# Patient Record
Sex: FEMALE | Race: WHITE | Employment: UNEMPLOYED | URBAN - METROPOLITAN AREA
[De-identification: names, ages, dates, MRNs, and addresses within clinical notes are randomized per-mention and may not be internally consistent; named-entity substitution may affect disease eponyms.]

---

## 2017-12-15 ENCOUNTER — TELEPHONE (OUTPATIENT)
Dept: BARIATRICS/WEIGHT MGMT | Age: 22
End: 2017-12-15

## 2019-07-18 ENCOUNTER — HOSPITAL ENCOUNTER (EMERGENCY)
Age: 24
Discharge: HOME | End: 2019-07-18
Payer: COMMERCIAL

## 2019-07-18 VITALS
TEMPERATURE: 99.1 F | HEIGHT: 68 IN | HEART RATE: 104 BPM | OXYGEN SATURATION: 97 % | BODY MASS INDEX: 40.92 KG/M2 | WEIGHT: 270 LBS | RESPIRATION RATE: 18 BRPM | SYSTOLIC BLOOD PRESSURE: 130 MMHG | DIASTOLIC BLOOD PRESSURE: 83 MMHG

## 2019-07-18 ASSESSMENT — PAIN SCALES - GENERAL: PAINLEVEL_OUTOF10: 9

## 2019-07-19 ENCOUNTER — APPOINTMENT (OUTPATIENT)
Dept: GENERAL RADIOLOGY | Age: 24
End: 2019-07-19
Payer: COMMERCIAL

## 2019-07-19 ENCOUNTER — APPOINTMENT (OUTPATIENT)
Dept: CT IMAGING | Age: 24
End: 2019-07-19
Payer: COMMERCIAL

## 2019-07-19 ENCOUNTER — HOSPITAL ENCOUNTER (EMERGENCY)
Age: 24
Discharge: HOME | End: 2019-07-20
Attending: EMERGENCY MEDICINE
Payer: COMMERCIAL

## 2019-07-19 ENCOUNTER — APPOINTMENT (OUTPATIENT)
Dept: ULTRASOUND IMAGING | Age: 24
End: 2019-07-19
Payer: COMMERCIAL

## 2019-07-19 DIAGNOSIS — R10.84 GENERALIZED ABDOMINAL PAIN: Primary | ICD-10-CM

## 2019-07-19 DIAGNOSIS — N12 PYELONEPHRITIS: ICD-10-CM

## 2019-07-19 DIAGNOSIS — R51.9 NONINTRACTABLE HEADACHE, UNSPECIFIED CHRONICITY PATTERN, UNSPECIFIED HEADACHE TYPE: ICD-10-CM

## 2019-07-19 DIAGNOSIS — R93.429 ABNORMAL CT SCAN, KIDNEY: ICD-10-CM

## 2019-07-19 LAB
ALBUMIN SERPL-MCNC: 3.8 GM/DL (ref 3.4–5)
ALP BLD-CCNC: 61 IU/L (ref 40–129)
ALT SERPL-CCNC: 23 U/L (ref 10–40)
ANION GAP SERPL CALCULATED.3IONS-SCNC: 11 MMOL/L (ref 4–16)
AST SERPL-CCNC: 19 IU/L (ref 15–37)
BASOPHILS ABSOLUTE: 0 K/CU MM
BASOPHILS RELATIVE PERCENT: 0.2 % (ref 0–1)
BILIRUB SERPL-MCNC: 0.9 MG/DL (ref 0–1)
BUN BLDV-MCNC: 6 MG/DL (ref 6–23)
CALCIUM SERPL-MCNC: 9.1 MG/DL (ref 8.3–10.6)
CHLORIDE BLD-SCNC: 101 MMOL/L (ref 99–110)
CO2: 24 MMOL/L (ref 21–32)
CREAT SERPL-MCNC: 0.9 MG/DL (ref 0.6–1.1)
DIFFERENTIAL TYPE: ABNORMAL
EOSINOPHILS ABSOLUTE: 0.1 K/CU MM
EOSINOPHILS RELATIVE PERCENT: 1.2 % (ref 0–3)
GFR AFRICAN AMERICAN: >60 ML/MIN/1.73M2
GFR NON-AFRICAN AMERICAN: >60 ML/MIN/1.73M2
GLUCOSE BLD-MCNC: 68 MG/DL (ref 70–99)
HCG QUALITATIVE: NEGATIVE
HCT VFR BLD CALC: 42.8 % (ref 37–47)
HEMOGLOBIN: 13.1 GM/DL (ref 12.5–16)
IMMATURE NEUTROPHIL %: 0.3 % (ref 0–0.43)
LACTATE: 0.7 MMOL/L (ref 0.4–2)
LIPASE: 30 IU/L (ref 13–60)
LYMPHOCYTES ABSOLUTE: 1.2 K/CU MM
LYMPHOCYTES RELATIVE PERCENT: 12.1 % (ref 24–44)
MCH RBC QN AUTO: 28.4 PG (ref 27–31)
MCHC RBC AUTO-ENTMCNC: 30.6 % (ref 32–36)
MCV RBC AUTO: 92.8 FL (ref 78–100)
MONOCYTES ABSOLUTE: 1 K/CU MM
MONOCYTES RELATIVE PERCENT: 10.1 % (ref 0–4)
NUCLEATED RBC %: 0 %
PDW BLD-RTO: 13.4 % (ref 11.7–14.9)
PLATELET # BLD: 228 K/CU MM (ref 140–440)
PMV BLD AUTO: 10.5 FL (ref 7.5–11.1)
POTASSIUM SERPL-SCNC: 3.5 MMOL/L (ref 3.5–5.1)
RBC # BLD: 4.61 M/CU MM (ref 4.2–5.4)
SEGMENTED NEUTROPHILS ABSOLUTE COUNT: 7.7 K/CU MM
SEGMENTED NEUTROPHILS RELATIVE PERCENT: 76.1 % (ref 36–66)
SODIUM BLD-SCNC: 136 MMOL/L (ref 135–145)
TOTAL IMMATURE NEUTOROPHIL: 0.03 K/CU MM
TOTAL NUCLEATED RBC: 0 K/CU MM
TOTAL PROTEIN: 6.8 GM/DL (ref 6.4–8.2)
TROPONIN T: <0.01 NG/ML
WBC # BLD: 10.2 K/CU MM (ref 4–10.5)

## 2019-07-19 PROCEDURE — 93005 ELECTROCARDIOGRAM TRACING: CPT | Performed by: EMERGENCY MEDICINE

## 2019-07-19 PROCEDURE — 96374 THER/PROPH/DIAG INJ IV PUSH: CPT

## 2019-07-19 PROCEDURE — 6360000002 HC RX W HCPCS: Performed by: PHYSICIAN ASSISTANT

## 2019-07-19 PROCEDURE — 87086 URINE CULTURE/COLONY COUNT: CPT

## 2019-07-19 PROCEDURE — 83605 ASSAY OF LACTIC ACID: CPT

## 2019-07-19 PROCEDURE — 85025 COMPLETE CBC W/AUTO DIFF WBC: CPT

## 2019-07-19 PROCEDURE — 84484 ASSAY OF TROPONIN QUANT: CPT

## 2019-07-19 PROCEDURE — 6370000000 HC RX 637 (ALT 250 FOR IP): Performed by: PHYSICIAN ASSISTANT

## 2019-07-19 PROCEDURE — 84703 CHORIONIC GONADOTROPIN ASSAY: CPT

## 2019-07-19 PROCEDURE — 2580000003 HC RX 258: Performed by: PHYSICIAN ASSISTANT

## 2019-07-19 PROCEDURE — 71275 CT ANGIOGRAPHY CHEST: CPT

## 2019-07-19 PROCEDURE — 87186 SC STD MICRODIL/AGAR DIL: CPT

## 2019-07-19 PROCEDURE — 83690 ASSAY OF LIPASE: CPT

## 2019-07-19 PROCEDURE — 80053 COMPREHEN METABOLIC PANEL: CPT

## 2019-07-19 PROCEDURE — 99284 EMERGENCY DEPT VISIT MOD MDM: CPT

## 2019-07-19 PROCEDURE — 71045 X-RAY EXAM CHEST 1 VIEW: CPT

## 2019-07-19 PROCEDURE — 81001 URINALYSIS AUTO W/SCOPE: CPT

## 2019-07-19 PROCEDURE — 87077 CULTURE AEROBIC IDENTIFY: CPT

## 2019-07-19 PROCEDURE — 93971 EXTREMITY STUDY: CPT

## 2019-07-19 PROCEDURE — 6360000004 HC RX CONTRAST MEDICATION: Performed by: PHYSICIAN ASSISTANT

## 2019-07-19 PROCEDURE — 74177 CT ABD & PELVIS W/CONTRAST: CPT

## 2019-07-19 RX ORDER — LORAZEPAM 2 MG/ML
1 INJECTION INTRAMUSCULAR ONCE
Status: DISCONTINUED | OUTPATIENT
Start: 2019-07-19 | End: 2019-07-20 | Stop reason: HOSPADM

## 2019-07-19 RX ORDER — DIPHENHYDRAMINE HCL 25 MG
25 TABLET ORAL ONCE
Status: COMPLETED | OUTPATIENT
Start: 2019-07-19 | End: 2019-07-19

## 2019-07-19 RX ORDER — SODIUM CHLORIDE 0.9 % (FLUSH) 0.9 %
10 SYRINGE (ML) INJECTION 2 TIMES DAILY
Status: DISCONTINUED | OUTPATIENT
Start: 2019-07-19 | End: 2019-07-20 | Stop reason: HOSPADM

## 2019-07-19 RX ORDER — 0.9 % SODIUM CHLORIDE 0.9 %
1000 INTRAVENOUS SOLUTION INTRAVENOUS ONCE
Status: COMPLETED | OUTPATIENT
Start: 2019-07-19 | End: 2019-07-19

## 2019-07-19 RX ORDER — METOCLOPRAMIDE HYDROCHLORIDE 5 MG/ML
10 INJECTION INTRAMUSCULAR; INTRAVENOUS ONCE
Status: COMPLETED | OUTPATIENT
Start: 2019-07-19 | End: 2019-07-19

## 2019-07-19 RX ORDER — ACETAMINOPHEN 500 MG
1000 TABLET ORAL ONCE
Status: COMPLETED | OUTPATIENT
Start: 2019-07-19 | End: 2019-07-19

## 2019-07-19 RX ADMIN — ACETAMINOPHEN 1000 MG: 500 TABLET ORAL at 22:01

## 2019-07-19 RX ADMIN — SODIUM CHLORIDE 1000 ML: 9 INJECTION, SOLUTION INTRAVENOUS at 22:00

## 2019-07-19 RX ADMIN — METOCLOPRAMIDE 10 MG: 5 INJECTION, SOLUTION INTRAMUSCULAR; INTRAVENOUS at 22:02

## 2019-07-19 RX ADMIN — IOPAMIDOL 100 ML: 755 INJECTION, SOLUTION INTRAVENOUS at 22:47

## 2019-07-19 RX ADMIN — DIPHENHYDRAMINE HCL 25 MG: 25 TABLET ORAL at 22:01

## 2019-07-19 RX ADMIN — Medication 10 ML: at 22:48

## 2019-07-19 ASSESSMENT — PAIN DESCRIPTION - PAIN TYPE: TYPE: ACUTE PAIN

## 2019-07-19 ASSESSMENT — PAIN SCALES - GENERAL
PAINLEVEL_OUTOF10: 9
PAINLEVEL_OUTOF10: 6

## 2019-07-19 ASSESSMENT — PAIN DESCRIPTION - LOCATION: LOCATION: ABDOMEN;HEAD

## 2019-07-19 NOTE — ED TRIAGE NOTES
Pt presents to ED for c/o abdominal cramping and n/v since last night. Pt states she had gastric bypass a month ago.

## 2019-07-19 NOTE — ED NOTES
Merline Jurist PA out to see pt in the waiting room but no answer when called.      Miri Saunders RN  07/18/19 5798

## 2019-07-20 VITALS
RESPIRATION RATE: 24 BRPM | HEART RATE: 86 BPM | WEIGHT: 293 LBS | SYSTOLIC BLOOD PRESSURE: 111 MMHG | OXYGEN SATURATION: 97 % | TEMPERATURE: 101.4 F | DIASTOLIC BLOOD PRESSURE: 73 MMHG | HEIGHT: 68 IN | BODY MASS INDEX: 44.41 KG/M2

## 2019-07-20 LAB
BACTERIA: NEGATIVE /HPF
BILIRUBIN URINE: NEGATIVE MG/DL
BLOOD, URINE: ABNORMAL
CLARITY: CLEAR
COLOR: YELLOW
GLUCOSE, URINE: NEGATIVE MG/DL
KETONES, URINE: ABNORMAL MG/DL
LEUKOCYTE ESTERASE, URINE: ABNORMAL
MUCUS: ABNORMAL HPF
NITRITE URINE, QUANTITATIVE: POSITIVE
PH, URINE: 6 (ref 5–8)
PROTEIN UA: 30 MG/DL
RBC URINE: 7 /HPF (ref 0–6)
SPECIFIC GRAVITY UA: >1.06 (ref 1–1.03)
SPECIFIC GRAVITY UA: ABNORMAL (ref 1–1.03)
SQUAMOUS EPITHELIAL: 9 /HPF
TRICHOMONAS: ABNORMAL /HPF
UROBILINOGEN, URINE: 2 MG/DL (ref 0.2–1)
WBC UA: 70 /HPF (ref 0–5)

## 2019-07-20 PROCEDURE — 93010 ELECTROCARDIOGRAM REPORT: CPT | Performed by: INTERNAL MEDICINE

## 2019-07-20 PROCEDURE — 6370000000 HC RX 637 (ALT 250 FOR IP): Performed by: PHYSICIAN ASSISTANT

## 2019-07-20 RX ORDER — ONDANSETRON 4 MG/1
4 TABLET, ORALLY DISINTEGRATING ORAL EVERY 6 HOURS
Qty: 10 TABLET | Refills: 0 | Status: SHIPPED | OUTPATIENT
Start: 2019-07-20 | End: 2020-02-17

## 2019-07-20 RX ORDER — CEPHALEXIN 250 MG/1
500 CAPSULE ORAL ONCE
Status: COMPLETED | OUTPATIENT
Start: 2019-07-20 | End: 2019-07-20

## 2019-07-20 RX ORDER — CEPHALEXIN 500 MG/1
500 CAPSULE ORAL 2 TIMES DAILY
Qty: 28 CAPSULE | Refills: 0 | Status: SHIPPED | OUTPATIENT
Start: 2019-07-20 | End: 2019-08-03

## 2019-07-20 RX ADMIN — CEPHALEXIN 500 MG: 250 CAPSULE ORAL at 01:08

## 2019-07-20 NOTE — ED PROVIDER NOTES
eMERGENCY dEPARTMENT eNCOUnter      PCP: No primary care provider on file. CHIEF COMPLAINT    Chief Complaint   Patient presents with    Abdominal Pain    Emesis    Dizziness     Pt was seen by physician    JHONNY    Jose Alejandro Gonzalez is a 30 y.o. female who presents with multiple complaints. She reports that she has been having some abdominal pain nausea vomiting for the last couple days. Presented to the ED yesterday but left without being seen. She was vomiting yesterday but states that she has been told to keep a lot of food down and has been having some issues with eating since her gastric bypass surgery in June of this year. She does report that the vomiting has resolved and she did have a small formed bowel movement today. She reports that the abdominal complaints are somewhat improving but still present. She presents today with chief complaint of headache. Reports that she has headache for the last several days. She does report history of headaches many years ago. She denies any head injury or trauma. She had some vague chest pain yesterday and had nothing since. She has had some intermittent lightheadedness over the last couple days. She denies current chest pain shortness of breath. REVIEW OF SYSTEMS    Constitutional:  Denies fever, chills, weight loss or weakness   HENT:  Denies sore throat or ear pain   Cardiovascular:  Denies current chest pain, palpitations   Respiratory:  Denies cough or shortness of breath    GI:  See hpi  :  Dark urine. Denies other urinary or vaginal complaints  Musculoskeletal:  Denies back pain,   Skin:  Denies rash  Neurologic:  Denies focal weakness or sensory changes.  Headache   Endocrine:  Denies polyuria or polydypsia   Lymphatic:  Denies swollen glands     All other review of systems are negative  See HPI and nursing notes for additional information     PAST MEDICAL AND SURGICAL HISTORY    Past Medical History:   Diagnosis Date    MRSA (methicillin Clinical  IMPRESSION    1. Generalized abdominal pain    2. Nonintractable headache, unspecified chronicity pattern, unspecified headache type    3. Abnormal CT scan, kidney    4. Pyelonephritis      Patient presents as above. She is mildly tachycardic and febrile on arrival.  Work-up was initiated as above. No complications related to surgery however she does have some abnormal findings on CT and bilateral kidneys. UA obtained and does reveal evidence of infection. We will treat abnormal findings on kidneys as likely pyelonephritis. On recheck patient feels much better. Her headache is gone. Her belly pain is gone. She is tolerating p.o. and is in no pain. She is agreeable to outpatient management at this time and understands if she develops any new or worsening symptoms that she will need to return to the ED for further evaluation. Discussed follow-up with PCP for recheck on abnormal findings on CT. Patient advised to return to the ED at anytime for new or worsening symptoms. Patient verbalized understanding and agreement with the POC. Comment: Please note this report has been produced using speech recognition software and may contain errors related to that system including errors in grammar, punctuation, and spelling, as well as words and phrases that may be inappropriate. If there are any questions or concerns please feel free to contact the dictating provider for clarification.         Kvng Christensen PA-C  07/23/19 2794

## 2019-07-20 NOTE — ED NOTES
Report headache pain 2 days. Reports emesis yesterday. C/O right leg pain for 2 days also. US at bedside.      Alex Sepulveda RN  07/19/19 1353

## 2019-07-22 LAB
CULTURE: ABNORMAL
Lab: ABNORMAL
SPECIMEN: ABNORMAL
TOTAL COLONY COUNT: ABNORMAL

## 2019-07-24 LAB
EKG ATRIAL RATE: 100 BPM
EKG DIAGNOSIS: NORMAL
EKG P AXIS: 33 DEGREES
EKG P-R INTERVAL: 124 MS
EKG Q-T INTERVAL: 330 MS
EKG QRS DURATION: 78 MS
EKG QTC CALCULATION (BAZETT): 425 MS
EKG R AXIS: 16 DEGREES
EKG T AXIS: 5 DEGREES
EKG VENTRICULAR RATE: 100 BPM

## 2020-02-17 ENCOUNTER — HOSPITAL ENCOUNTER (EMERGENCY)
Age: 25
Discharge: HOME | End: 2020-02-17
Payer: COMMERCIAL

## 2020-02-17 VITALS
SYSTOLIC BLOOD PRESSURE: 117 MMHG | OXYGEN SATURATION: 98 % | HEIGHT: 68 IN | RESPIRATION RATE: 18 BRPM | DIASTOLIC BLOOD PRESSURE: 69 MMHG | BODY MASS INDEX: 44.85 KG/M2 | TEMPERATURE: 98.4 F | HEART RATE: 90 BPM

## 2020-02-17 PROCEDURE — 99283 EMERGENCY DEPT VISIT LOW MDM: CPT

## 2020-02-17 RX ORDER — DEXTROMETHORPHAN HYDROBROMIDE AND PROMETHAZINE HYDROCHLORIDE 15; 6.25 MG/5ML; MG/5ML
5 SYRUP ORAL 4 TIMES DAILY PRN
Qty: 120 ML | Refills: 0 | Status: SHIPPED | OUTPATIENT
Start: 2020-02-17 | End: 2020-02-24

## 2020-02-17 ASSESSMENT — PAIN DESCRIPTION - PAIN TYPE: TYPE: ACUTE PAIN

## 2020-02-17 ASSESSMENT — PAIN SCALES - GENERAL: PAINLEVEL_OUTOF10: 7

## 2020-02-17 NOTE — ED PROVIDER NOTES
file   [facility] needs:     Medical: Not on file     Non-medical: Not on file   Tobacco Use    Smoking status: Never Smoker    Smokeless tobacco: Never Used   Substance and Sexual Activity    Alcohol use: No    Drug use: No    Sexual activity: Yes     Partners: Male   Lifestyle    Physical activity:     Days per week: Not on file     Minutes per session: Not on file    Stress: Not on file   Relationships    Social connections:     Talks on phone: Not on file     Gets together: Not on file     Attends Zoroastrianism service: Not on file     Active member of club or organization: Not on file     Attends meetings of clubs or organizations: Not on file     Relationship status: Not on file    Intimate partner violence:     Fear of current or ex partner: Not on file     Emotionally abused: Not on file     Physically abused: Not on file     Forced sexual activity: Not on file   Other Topics Concern    Not on file   Social History Narrative    Not on file       PHYSICAL EXAM    VITAL SIGNS: /69   Pulse 90   Temp 98.4 °F (36.9 °C) (Oral)   Resp 18   Ht 5' 8\" (1.727 m)   SpO2 98%   BMI 44.85 kg/m²      Constitutional:  Well developed, well nourished, no acute distress, non-toxic appearance   Eyes: Conjunctiva normal, sclera non-icteric  HEENT:     - Normocephalic, atraumatic   - PERRL, EOM intact. Conjunctiva normal    -  Frontal/Maxillary sinuses NONtender to percussion.   - External auditory canals  clear   - TMs clear without discharge, fluid, or bulging.   - Nasal passages with mildly erythematous and edematous turbinates. No massess.   - Oropharynx mildly erythematous without tonsillar hypertrophy or exudate. Neck/Lymphatics:    - supple, no JVD, no swollen nodes     Respiratory:     Nonlabored breathing - No retractions, no accessory muscle use   Clear to auscultation bilateral lung fields, no wheezes/rales/rhonchi.    Cardiovascular:  Normal rate, normal rhythm   GI:  Soft, no abdominal tenderness, no guarding. Musculoskeletal:  No obvious deficits, no edema   Integument:  Skin pink, warm, dry, intact           ED COURSE & MEDICAL DECISION MAKING        I discussed the likely viral etiology of patient's symptoms, possibly influenza, with Pt today and recommend symptomatic treatment with increase fluids, rest.  Patient is nontoxic-appearing, afebrile, well-hydrated and I do not feel further evaluation, intervention is warranted at this time. I provided symptomatic treatment. I recommend followup with primary care provider in 2-3 days for recheck. Patient agrees to return emergency department if symptoms worsen or any new symptoms develop. Clinical  IMPRESSION    1. Upper respiratory tract infection, unspecified type    2. Cough          Comment: Please note this report has been produced using speech recognition software and may contain errors related to that system including errors in grammar, punctuation, and spelling, as well as words and phrases that may be inappropriate. If there are any questions or concerns please feel free to contact the dictating provider for clarification.        Doretha Ochoa [place]  02/17/20 0916

## 2020-06-17 ENCOUNTER — APPOINTMENT (OUTPATIENT)
Dept: GENERAL RADIOLOGY | Age: 25
End: 2020-06-17
Payer: COMMERCIAL

## 2020-06-17 ENCOUNTER — HOSPITAL ENCOUNTER (EMERGENCY)
Age: 25
Discharge: HOME | End: 2020-06-17
Attending: EMERGENCY MEDICINE
Payer: COMMERCIAL

## 2020-06-17 VITALS
DIASTOLIC BLOOD PRESSURE: 88 MMHG | SYSTOLIC BLOOD PRESSURE: 124 MMHG | TEMPERATURE: 98.8 F | HEIGHT: 68 IN | WEIGHT: 210 LBS | BODY MASS INDEX: 31.83 KG/M2 | RESPIRATION RATE: 17 BRPM | HEART RATE: 74 BPM | OXYGEN SATURATION: 99 %

## 2020-06-17 LAB
SARS-COV-2, NAAT: NOT DETECTED
SOURCE: NORMAL

## 2020-06-17 PROCEDURE — 99284 EMERGENCY DEPT VISIT MOD MDM: CPT

## 2020-06-17 PROCEDURE — U0002 COVID-19 LAB TEST NON-CDC: HCPCS

## 2020-06-17 PROCEDURE — 6370000000 HC RX 637 (ALT 250 FOR IP): Performed by: EMERGENCY MEDICINE

## 2020-06-17 PROCEDURE — 71045 X-RAY EXAM CHEST 1 VIEW: CPT

## 2020-06-17 RX ORDER — ACETAMINOPHEN 325 MG/1
TABLET ORAL
Status: DISCONTINUED
Start: 2020-06-17 | End: 2020-06-17 | Stop reason: HOSPADM

## 2020-06-17 RX ORDER — ACETAMINOPHEN 325 MG/1
650 TABLET ORAL ONCE
Status: COMPLETED | OUTPATIENT
Start: 2020-06-17 | End: 2020-06-17

## 2020-06-17 RX ADMIN — ACETAMINOPHEN 650 MG: 325 TABLET, FILM COATED ORAL at 00:27

## 2020-06-17 ASSESSMENT — PAIN SCALES - GENERAL
PAINLEVEL_OUTOF10: 4
PAINLEVEL_OUTOF10: 4

## 2020-06-17 ASSESSMENT — PAIN DESCRIPTION - PAIN TYPE: TYPE: ACUTE PAIN

## 2021-04-28 ENCOUNTER — HOSPITAL ENCOUNTER (EMERGENCY)
Age: 26
Discharge: LEFT BEFORE DISCHARGE | End: 2021-04-28
Payer: COMMERCIAL

## 2021-12-03 ENCOUNTER — HOSPITAL ENCOUNTER (EMERGENCY)
Age: 26
Discharge: HOME | End: 2021-12-03
Payer: COMMERCIAL

## 2021-12-03 ENCOUNTER — APPOINTMENT (OUTPATIENT)
Dept: GENERAL RADIOLOGY | Age: 26
End: 2021-12-03
Payer: COMMERCIAL

## 2021-12-03 VITALS
HEART RATE: 95 BPM | OXYGEN SATURATION: 99 % | DIASTOLIC BLOOD PRESSURE: 70 MMHG | RESPIRATION RATE: 20 BRPM | TEMPERATURE: 98 F | SYSTOLIC BLOOD PRESSURE: 113 MMHG

## 2021-12-03 DIAGNOSIS — R05.9 COUGH: ICD-10-CM

## 2021-12-03 DIAGNOSIS — J01.90 ACUTE SINUSITIS, RECURRENCE NOT SPECIFIED, UNSPECIFIED LOCATION: Primary | ICD-10-CM

## 2021-12-03 PROCEDURE — U0003 INFECTIOUS AGENT DETECTION BY NUCLEIC ACID (DNA OR RNA); SEVERE ACUTE RESPIRATORY SYNDROME CORONAVIRUS 2 (SARS-COV-2) (CORONAVIRUS DISEASE [COVID-19]), AMPLIFIED PROBE TECHNIQUE, MAKING USE OF HIGH THROUGHPUT TECHNOLOGIES AS DESCRIBED BY CMS-2020-01-R: HCPCS

## 2021-12-03 PROCEDURE — 71045 X-RAY EXAM CHEST 1 VIEW: CPT

## 2021-12-03 PROCEDURE — U0005 INFEC AGEN DETEC AMPLI PROBE: HCPCS

## 2021-12-03 PROCEDURE — 99283 EMERGENCY DEPT VISIT LOW MDM: CPT

## 2021-12-03 RX ORDER — AMOXICILLIN AND CLAVULANATE POTASSIUM 875; 125 MG/1; MG/1
1 TABLET, FILM COATED ORAL 2 TIMES DAILY
Qty: 14 TABLET | Refills: 0 | Status: SHIPPED | OUTPATIENT
Start: 2021-12-03 | End: 2021-12-10

## 2021-12-03 ASSESSMENT — PAIN DESCRIPTION - FREQUENCY: FREQUENCY: CONTINUOUS

## 2021-12-03 ASSESSMENT — PAIN DESCRIPTION - LOCATION: LOCATION: HEAD;GENERALIZED

## 2021-12-03 ASSESSMENT — PAIN DESCRIPTION - DESCRIPTORS: DESCRIPTORS: ACHING

## 2021-12-03 ASSESSMENT — PAIN DESCRIPTION - PAIN TYPE: TYPE: ACUTE PAIN

## 2021-12-03 ASSESSMENT — PAIN SCALES - GENERAL: PAINLEVEL_OUTOF10: 9

## 2021-12-03 NOTE — ED NOTES
Patient given discharge instructions at this time. Verbalizes understanding and denies any further needs, questions, concerns or complaints. Patient ambulatory to front of ER in standing, up position in stable condition.       Marietta Estrella RN  12/03/21 0080

## 2021-12-03 NOTE — ED PROVIDER NOTES
EMERGENCY DEPARTMENT ENCOUNTER      PCP: No primary care provider on file. CHIEF COMPLAINT    Chief Complaint   Patient presents with    URI     fever, cough, sore throat, chills, bodyaches       This patient was not evaluated by the attending physician. I have independently evaluated this patient. HPI    Esa Rodriguez is a 27 y.o. female who presents with sore throat, nasal congestion, cough. Onset a week and a half ago. Patient states symptoms have continued to worsen. Patient has associated body aches, fever, headache. Patient denies chest pain or shortness of breath. Patient denies abdominal pain, vomiting or diarrhea. Patient has received first dose of Covid vaccine. Patient is taken over-the-counter Tylenol without significant improvement. Patient denies abdominal pain. Patient denies pregnancy. REVIEW OF SYSTEMS    Constitutional: See HPI  HEENT:  See above  Cardiovascular:  Denies chest pain  Respiratory: + cough Denies shortness of breath   GI:  Denies abdominal pain, vomiting   :  Denies any urinary symptoms  Neurologic:  Denies syncope       All other review of systems are negative  See HPI and nursing notes for additional information       PAST MEDICAL AND SURGICAL HISTORY    Past Medical History:   Diagnosis Date    MRSA (methicillin resistant staph aureus) culture positive      Past Surgical History:   Procedure Laterality Date     SECTION  2012    GASTRIC BAND         CURRENT MEDICATIONS        ALLERGIES    No Known Allergies    FAMILY AND SOCIAL HISTORY    No family history on file.   Social History     Socioeconomic History    Marital status: Single     Spouse name: Not on file    Number of children: Not on file    Years of education: Not on file    Highest education level: Not on file   Occupational History    Not on file   Tobacco Use    Smoking status: Never Smoker    Smokeless tobacco: Never Used   Vaping Use    Vaping Use: Never used   Substance and Sexual Activity    Alcohol use: No    Drug use: No    Sexual activity: Yes     Partners: Male   Other Topics Concern    Not on file   Social History Narrative    Not on file     Social Determinants of Health     Financial Resource Strain:     Difficulty of Paying Living Expenses: Not on file   Food Insecurity:     Worried About Running Out of Food in the Last Year: Not on file    [place] of Food in the Last Year: Not on file   Transportation Needs:     Lack of Transportation (Medical): Not on file    Lack of Transportation (Non-Medical): Not on file   Physical Activity:     Days of Exercise per Week: Not on file    Minutes of Exercise per Session: Not on file   Stress:     Feeling of Stress : Not on file   Social Connections:     Frequency of Communication with Friends and Family: Not on file    Frequency of Social Gatherings with Friends and Family: Not on file    Attends Buddhism Services: Not on file    Active Member of [address] [place] [address] or Organizations: Not on file    Attends Club or Organization Meetings: Not on file    Marital Status: Not on file   Intimate Partner Violence:     Fear of Current or Ex-Partner: Not on file    Emotionally Abused: Not on file    Physically Abused: Not on file    Sexually Abused: Not on file   Housing Stability:     Unable to Pay for Housing in the Last Year: Not on file    Number of [place] in the Last Year: Not on file    Unstable Housing in the Last Year: Not on file       PHYSICAL EXAM    VITAL SIGNS: /70   Pulse 95   Temp 98 °F (36.7 °C) (Oral)   Resp 20   SpO2 99%   Constitutional:  Well developed, well nourished, no acute distress, non-toxic appearance   Eyes:    -PERRL, EOM intact. Conjunctiva normal, sclera non-icteric  HEENT:     - Normocephalic, atraumatic     - Nasal passages with mildly erythematous and edematous turbinates. No massess.   - Oropharynx mildly erythematous without tonsillar hypertrophy or exudate.   Neck/Lymphatics:

## 2021-12-03 NOTE — LETTER
[address] [facility] Emergency Department  [address] [id number]  Phone: [phone]  Fax: [phone]               December 3, 2021    Patient: Felicia Arshad   YOB: 1995   Date of Visit: 12/3/2021   [id number]    To Whom It May Concern:    Felicia Arshad was seen and treated in our emergency department on 12/3/2021. She may return to work on 12/13/2021 or when COVID test is negative.       Sincerely,       Bud Tobias        Signature:__________________________________

## 2021-12-04 LAB
SARS-COV-2: DETECTED
SOURCE: ABNORMAL

## 2021-12-30 ENCOUNTER — HOSPITAL ENCOUNTER (EMERGENCY)
Age: 26
Discharge: HOME | End: 2021-12-30
Attending: EMERGENCY MEDICINE
Payer: COMMERCIAL

## 2021-12-30 VITALS
DIASTOLIC BLOOD PRESSURE: 72 MMHG | WEIGHT: 210 LBS | HEIGHT: 68 IN | SYSTOLIC BLOOD PRESSURE: 123 MMHG | OXYGEN SATURATION: 98 % | HEART RATE: 85 BPM | RESPIRATION RATE: 14 BRPM | TEMPERATURE: 98.6 F | BODY MASS INDEX: 31.83 KG/M2

## 2021-12-30 DIAGNOSIS — T50.901A ACCIDENTAL DRUG OVERDOSE, INITIAL ENCOUNTER: Primary | ICD-10-CM

## 2021-12-30 PROCEDURE — 6370000000 HC RX 637 (ALT 250 FOR IP): Performed by: EMERGENCY MEDICINE

## 2021-12-30 PROCEDURE — 99285 EMERGENCY DEPT VISIT HI MDM: CPT

## 2021-12-30 RX ORDER — ACETAMINOPHEN 325 MG/1
650 TABLET ORAL ONCE
Status: COMPLETED | OUTPATIENT
Start: 2021-12-30 | End: 2021-12-30

## 2021-12-30 RX ADMIN — ACETAMINOPHEN 650 MG: 325 TABLET ORAL at 01:35

## 2021-12-30 ASSESSMENT — PAIN SCALES - GENERAL: PAINLEVEL_OUTOF10: 6

## 2021-12-30 ASSESSMENT — ENCOUNTER SYMPTOMS
RESPIRATORY NEGATIVE: 1
EYES NEGATIVE: 1
GASTROINTESTINAL NEGATIVE: 1
ALLERGIC/IMMUNOLOGIC NEGATIVE: 1

## 2021-12-30 NOTE — ED TRIAGE NOTES
26 Y/o presented to ED due to possible ingestion of a controlled substance. As per EMS, 8 mg narcan given . Patient was awake upon their arrival.     Patient stated smoking crack , did not use Heroin.

## 2021-12-30 NOTE — ED PROVIDER NOTES
[facility]      TRIAGE CHIEF COMPLAINT:   Drug Overdose      [place]:  Jolanta Morris is a 27 y.o. female that presents with complaint of accidental drug overdose. Patient brought in by EMS after she received 8 mg of Narcan. Reportedly patient states she smoked crack and then took another smoking another powder which she thought was cracked but apparently was laced with opiates. She denies any SI HI this was an accidental overdose. Denies any fevers nausea vomiting chest pain shortness of breath does have a slight headache after getting Narcan denies any head trauma. No other questions or concerns. REVIEW OF SYSTEMS:  At least 10 systems reviewed and otherwise acutely negative except as in the [address] Review of Systems   Constitutional: Negative. Accidental overdose     HENT: Negative. Eyes: Negative. Respiratory: Negative. Cardiovascular: Negative. Gastrointestinal: Negative. Endocrine: Negative. Genitourinary: Negative. Musculoskeletal: Negative. Skin: Negative. Allergic/Immunologic: Negative. Neurological: Positive for headaches. Hematological: Negative. Psychiatric/Behavioral: Negative. All other systems reviewed and are negative. Past Medical History:   Diagnosis Date    MRSA (methicillin resistant staph aureus) culture positive      Past Surgical History:   Procedure Laterality Date     SECTION  2012    GASTRIC BAND       No family history on file.   Social History     Socioeconomic History    Marital status: Single     Spouse name: Not on file    Number of children: Not on file    Years of education: Not on file    Highest education level: Not on file   Occupational History    Not on file   Tobacco Use    Smoking status: Never Smoker    Smokeless tobacco: Never Used   Vaping Use    Vaping Use: Never used   Substance and Sexual Activity    Alcohol use: No    Drug use: No    Sexual activity: Yes Partners: Male   Other Topics Concern    Not on file   Social History Narrative    Not on file     Social Determinants of Health     Financial Resource Strain:     Difficulty of Paying Living Expenses: Not on file   Food Insecurity:     Worried About Running Out of Food in the Last Year: Not on file    [place] of Food in the Last Year: Not on file   Transportation Needs:     Lack of Transportation (Medical): Not on file    Lack of Transportation (Non-Medical): Not on file   Physical Activity:     Days of Exercise per Week: Not on file    Minutes of Exercise per Session: Not on file   Stress:     Feeling of Stress : Not on file   Social Connections:     Frequency of Communication with Friends and Family: Not on file    Frequency of Social Gatherings with Friends and Family: Not on file    Attends Yarsani Services: Not on file    Active Member of [address] [place] [address] [employer] or Organizations: Not on file    Attends Club or Organization Meetings: Not on file    Marital Status: Not on file   Intimate Partner Violence:     Fear of Current or Ex-Partner: Not on file    Emotionally Abused: Not on file    Physically Abused: Not on file    Sexually Abused: Not on file   Housing Stability:     Unable to Pay for Housing in the Last Year: Not on file    Number of [place] in the Last Year: Not on file    Unstable Housing in the Last Year: Not on file     No current facility-administered medications for this encounter. Current Outpatient Medications   Medication Sig Dispense Refill    guaiFENesin (ROBITUSSIN) 100 MG/5ML liquid 1 teaspoon by mouth every 4-6 hours when necessary cough 120 mL 0      No Known Allergies  No current facility-administered medications for this encounter.      Current Outpatient Medications   Medication Sig Dispense Refill    guaiFENesin (ROBITUSSIN) 100 MG/5ML liquid 1 teaspoon by mouth every 4-6 hours when necessary cough 120 mL 0       Nursing Notes Reviewed    VITAL SIGNS:  ED Triage Vitals [12/30/21 0026]   Enc Vitals Group      /72      Pulse 85      Resp 14      Temp 98.6 °F (37 °C)      Temp Source Oral      SpO2 98 %      Weight 210 lb (95.3 kg)      Height 5' 8\" (1.727 m)      Head Circumference       Peak Flow       Pain Score       Pain Loc       Pain Edu? Excl. in [address]? PHYSICAL EXAM:  Physical Exam  Vitals and nursing note reviewed. Constitutional:       General: She is not in acute distress. Appearance: Normal appearance. She is well-developed and well-groomed. She is not ill-appearing, toxic-appearing or diaphoretic. HENT:      Head: Normocephalic and atraumatic. Right Ear: External ear normal.      Left Ear: External ear normal.      Nose: No congestion or rhinorrhea. Eyes:      General: No scleral icterus. Right eye: No discharge. Left eye: No discharge. Extraocular Movements: Extraocular movements intact. Conjunctiva/sclera: Conjunctivae normal.   Neck:      Vascular: No JVD. Trachea: Phonation normal.   Cardiovascular:      Rate and Rhythm: Normal rate and regular rhythm. Pulses: Normal pulses. Heart sounds: Normal heart sounds. No murmur heard. No friction rub. No gallop. Pulmonary:      Effort: Pulmonary effort is normal. No respiratory distress. Breath sounds: Normal breath sounds. No stridor. No wheezing, rhonchi or rales. Abdominal:      General: Bowel sounds are normal. There is no distension. Palpations: Abdomen is soft. There is no mass. Tenderness: There is no abdominal tenderness. There is no guarding or rebound. Negative signs include Saul's sign, Rovsing's sign and McBurney's sign. Hernia: No hernia is present. Musculoskeletal:         General: No swelling, tenderness, deformity or signs of injury. Normal range of motion. Cervical back: Full passive range of motion without pain and normal range of motion.  No edema, erythema, signs of trauma, rigidity, torticollis or crepitus. No pain with movement. Normal range of motion. Right lower leg: No edema. Left lower leg: No edema. Skin:     General: Skin is warm. Coloration: Skin is not jaundiced or pale. Findings: No bruising, erythema, lesion or rash. Neurological:      General: No focal deficit present. Mental Status: She is alert and oriented to person, place, and time. GCS: GCS eye subscore is 4. GCS verbal subscore is 5. GCS motor subscore is 6. Cranial Nerves: Cranial nerves are intact. No cranial nerve deficit or dysarthria. Sensory: Sensation is intact. No sensory deficit. Motor: Motor function is intact. No weakness, tremor, atrophy, abnormal muscle tone or seizure activity. Coordination: Coordination is intact. Coordination normal.   Psychiatric:         Mood and Affect: Mood normal.         Behavior: Behavior normal. Behavior is cooperative. Thought Content: Thought content normal.         Judgment: Judgment normal.           I have reviewed andinterpreted all of the currently available lab results from this visit (if applicable):    No results found for this visit on 12/30/21. Radiographs (if obtained):  [] The following radiograph was interpreted by myself in the absence of a radiologist:  [x] Radiologist's Report Reviewed:    XR CHEST PORTABLE    Result Date: 12/3/2021  EXAMINATION: ONE XRAY VIEW OF THE CHEST 12/3/2021 3:59 pm COMPARISON: 06/17/2020 HISTORY: ORDERING SYSTEM PROVIDED HISTORY: cough Reason for Exam: cough, URI Acuity: Acute Type of Exam: Initial FINDINGS: The lungs are without acute focal process. No effusion or pneumothorax. The cardiomediastinal silhouette is normal.  The osseous structures are intact without acute process. Unremarkable chest.       EKG (if obtained): (All EKG's are interpreted by myself in the absence of a cardiologist)    MDM:    Patient with complaint of accidental drug overdose.   Again she is brought in by EMS

## 2022-12-13 ENCOUNTER — HOSPITAL ENCOUNTER (EMERGENCY)
Age: 27
Discharge: HOME | End: 2022-12-13
Payer: COMMERCIAL

## 2022-12-13 VITALS
TEMPERATURE: 98.1 F | HEART RATE: 97 BPM | SYSTOLIC BLOOD PRESSURE: 118 MMHG | BODY MASS INDEX: 30.31 KG/M2 | RESPIRATION RATE: 16 BRPM | HEIGHT: 68 IN | WEIGHT: 200 LBS | DIASTOLIC BLOOD PRESSURE: 68 MMHG | OXYGEN SATURATION: 98 %

## 2022-12-13 DIAGNOSIS — J06.9 UPPER RESPIRATORY TRACT INFECTION, UNSPECIFIED TYPE: Primary | ICD-10-CM

## 2022-12-13 DIAGNOSIS — J34.89 RHINORRHEA: ICD-10-CM

## 2022-12-13 PROCEDURE — 99283 EMERGENCY DEPT VISIT LOW MDM: CPT | Performed by: PHYSICIAN ASSISTANT

## 2022-12-13 RX ORDER — IBUPROFEN 600 MG/1
600 TABLET ORAL EVERY 6 HOURS PRN
Qty: 20 TABLET | Refills: 0 | Status: SHIPPED | OUTPATIENT
Start: 2022-12-13 | End: 2023-01-12

## 2022-12-13 RX ORDER — OXYMETAZOLINE HYDROCHLORIDE 0.05 G/100ML
2 SPRAY NASAL 2 TIMES DAILY PRN
Qty: 1 EACH | Refills: 0 | Status: SHIPPED | OUTPATIENT
Start: 2022-12-13 | End: 2022-12-16

## 2022-12-13 RX ORDER — GUAIFENESIN/DEXTROMETHORPHAN 100-10MG/5
5 SYRUP ORAL 3 TIMES DAILY PRN
Qty: 120 ML | Refills: 0 | Status: SHIPPED | OUTPATIENT
Start: 2022-12-13 | End: 2022-12-18

## 2022-12-13 RX ORDER — ACETAMINOPHEN 325 MG/1
650 TABLET ORAL EVERY 6 HOURS PRN
Qty: 40 TABLET | Refills: 0 | Status: SHIPPED | OUTPATIENT
Start: 2022-12-13

## 2022-12-13 NOTE — Clinical Note
Marion Chavarria was seen and treated in our emergency department on 12/13/2022. Patient was seen and evaluated. Cleared from an ED standpoint.     CURT Arciniega

## 2022-12-14 NOTE — ED PROVIDER NOTES
EMERGENCY DEPARTMENT ENCOUNTER      PCP: No primary care provider on file. CHIEF COMPLAINT    Chief Complaint   Patient presents with    Illness     sniffles     This patient was not evaluated by the attending physician. I have independently evaluated this patient . HPI    Carlos Dumont is a 32 y.o. female who presents to the emergency department today with nasal congestion. Patient states that she was sent by the homeless shelter to be \"cleared\". Patient states that she has had some underlying nasal congestion, no active fevers no cough or congestion. States she otherwise feels fine, states that she needs a note for the homeless shelter to be able to be accepted. REVIEW OF SYSTEMS    Constitutional:  Denies fever, chills  HEENT:  See above  Cardiovascular:  Denies chest pain, palpitations. Respiratory:  Denies wheezes or shortness of breath   GI:  Denies abdominal pain, vomiting, or diarrhea   Neurologic:  Denies confusion, light headedness, dizziness, or syncope   Skin:  No rash    All other review of systems are negative  See HPI and nursing notes for additional information       PAST MEDICAL AND SURGICAL HISTORY    Past Medical History:   Diagnosis Date    MRSA (methicillin resistant staph aureus) culture positive      Past Surgical History:   Procedure Laterality Date     SECTION  2012    GASTRIC BAND         CURRENT MEDICATIONS    Current Outpatient Rx   Medication Sig Dispense Refill    guaiFENesin-dextromethorphan (ROBITUSSIN DM) 100-10 MG/5ML syrup Take 5 mLs by mouth 3 times daily as needed for Cough 120 mL 0    ibuprofen (IBU) 600 MG tablet Take 1 tablet by mouth every 6 hours as needed for Pain 20 tablet 0    acetaminophen (AMINOFEN) 325 MG tablet Take 2 tablets by mouth every 6 hours as needed for Pain 40 tablet 0    oxymetazoline (12 HOUR NASAL SPRAY) 0.05 % nasal spray 2 sprays by Nasal route 2 times daily as needed for Congestion 3 day maximum use.  1 each 0 ALLERGIES    No Known Allergies    FAMILY AND SOCIAL HISTORY    History reviewed. No pertinent family history. Social History     Socioeconomic History    Marital status: Single     Spouse name: None    Number of children: None    Years of education: None    Highest education level: None   Tobacco Use    Smoking status: Never    Smokeless tobacco: Never   Vaping Use    Vaping Use: Never used   Substance and Sexual Activity    Alcohol use: No    Drug use: No    Sexual activity: Yes     Partners: Male       PHYSICAL EXAM    VITAL SIGNS: /68   Pulse 97   Temp 98.1 °F (36.7 °C) (Oral)   Resp 16   Ht 5' 8\" (1.727 m)   Wt 200 lb (90.7 kg)   SpO2 98%   BMI 30.41 kg/m²   Constitutional:  Well developed, well nourished, no acute distress, non-toxic appearance   Eyes:    -PERRL, EOM intact. Conjunctiva normal, sclera non-icteric  HEENT:     - Normocephalic, atraumatic     -  Frontal/Maxillary sinuses NONtender to percussion.   - External auditory canals clear   - TMs clear without discharge, fluid, or bulging.   - Nasal passages with mildly erythematous and edematous turbinates. No massess.   - Oropharynx mildly erythematous without tonsillar hypertrophy or exudate. Neck/Lymphatics:    - supple, no JVD, no swollen nodes  Respiratory:    Nonlabored breathing - No retractions, no accessory muscle use   Clear to auscultation bilateral lung fields, no wheezes/rales/rhonchi. Cardiovascular:  Normal rate, normal rhythm   GI:  Soft, no abdominal tenderness, no guarding. Musculoskeletal:  No obvious deficits, no edema   Integument:  Skin pink, warm, dry, intact     RADIOLOGY/PROCEDURES    No results found for this visit on 12/13/22. ED COURSE & MEDICAL DECISION MAKING      I discussed the likely viral etiology of patient's symptoms with Pt today and recommend symptomatic treatment with increase fluids, rest.   I provided symptomatic treatment.   I recommend followup with primary care provider in 2-3 days for recheck. Patient agrees to return emergency department if symptoms worsen or any new symptoms develop. Vital signs and nursing notes reviewed during ED course. Clinical  IMPRESSION    1. Upper respiratory tract infection, unspecified type    2. Rhinorrhea      Comment: Please note this report has been produced using speech recognition software and may contain errors related to that system including errors in grammar, punctuation, and spelling, as well as words and phrases that may be inappropriate. If there are any questions or concerns please feel free to contact the dictating provider for clarification.       Robin Haddad [address], PA  12/13/22 [id number]

## 2024-11-17 ENCOUNTER — HOSPITAL ENCOUNTER (EMERGENCY)
Age: 29
Discharge: HOME | End: 2024-11-17
Attending: EMERGENCY MEDICINE
Payer: COMMERCIAL

## 2024-11-17 VITALS
DIASTOLIC BLOOD PRESSURE: 94 MMHG | BODY MASS INDEX: 27.28 KG/M2 | HEART RATE: 89 BPM | TEMPERATURE: 98.2 F | RESPIRATION RATE: 16 BRPM | OXYGEN SATURATION: 99 % | HEIGHT: 68 IN | WEIGHT: 180 LBS | SYSTOLIC BLOOD PRESSURE: 125 MMHG

## 2024-11-17 DIAGNOSIS — Z63.9 FAMILY PROBLEMS: Primary | ICD-10-CM

## 2024-11-17 DIAGNOSIS — S80.819A ABRASION OF LOWER EXTREMITY, UNSPECIFIED LATERALITY, INITIAL ENCOUNTER: ICD-10-CM

## 2024-11-17 PROCEDURE — 99282 EMERGENCY DEPT VISIT SF MDM: CPT

## 2024-11-17 SDOH — SOCIAL STABILITY - SOCIAL INSECURITY: PROBLEM RELATED TO PRIMARY SUPPORT GROUP, UNSPECIFIED: Z63.9

## 2024-11-17 ASSESSMENT — PAIN SCALES - GENERAL
PAINLEVEL_OUTOF10: 0
PAINLEVEL_OUTOF10: 0

## 2024-11-17 ASSESSMENT — PAIN - FUNCTIONAL ASSESSMENT: PAIN_FUNCTIONAL_ASSESSMENT: 0-10
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
## 2024-11-17 NOTE — CARE COORDINATION
Patient needing housing and resources for [facility] according to Dr. Villa. CM provided patient with all needed resources for [facility].